# Patient Record
Sex: FEMALE | Race: AMERICAN INDIAN OR ALASKA NATIVE
[De-identification: names, ages, dates, MRNs, and addresses within clinical notes are randomized per-mention and may not be internally consistent; named-entity substitution may affect disease eponyms.]

---

## 2019-03-18 ENCOUNTER — HOSPITAL ENCOUNTER (OUTPATIENT)
Dept: HOSPITAL 5 - SPVIMAG | Age: 79
Discharge: HOME | End: 2019-03-18
Payer: MEDICARE

## 2019-03-18 DIAGNOSIS — M43.16: ICD-10-CM

## 2019-03-18 DIAGNOSIS — M16.11: Primary | ICD-10-CM

## 2019-03-18 DIAGNOSIS — M41.86: ICD-10-CM

## 2019-03-18 PROCEDURE — 72100 X-RAY EXAM L-S SPINE 2/3 VWS: CPT

## 2019-03-18 NOTE — XRAY REPORT
XRAY RIGHT HIP TWO VIEWS: 03/18/19 13:29:00





CLINICAL: Right hip pain.



FINDINGS: No fracture or dislocation.Mild arthritis with central joint 

space narrowing and mild superior acetabular eburnation.  Minimal 

osteophyte formation.  Similar but lesser changes in the left hip.  The 

pelvic bones are intact.  A left pelvic phlebolith.



IMPRESSION: Mild arthritis.

## 2019-03-18 NOTE — XRAY REPORT
XRAY LUMBAR SPINE THREE VIEWS: 03/18/19 13:29:00





CLINICAL: Right hip pain.



COMPARISON: 06/02/15



FINDINGS: Grade I L4-5 spondylolisthesis with no pars defect.  The rest 

of the bodies are normal alignment.  Normal vertebral body height.  

Disc spaces are preserved.  Very mild dextroscoliosis centered at L3-4. 

Moderate lower lumbar facet joint sclerosis. The pedicles are intact.  

No fracture.  Normal soft tissues.



IMPRESSION: Scoliosis, multilevel facet joint arthropathy and grade I 

L4-5 spondylolisthesis without significant change compared to the 

previous exam.